# Patient Record
Sex: MALE | Race: WHITE | Employment: UNEMPLOYED | URBAN - METROPOLITAN AREA
[De-identification: names, ages, dates, MRNs, and addresses within clinical notes are randomized per-mention and may not be internally consistent; named-entity substitution may affect disease eponyms.]

---

## 2017-12-13 ENCOUNTER — NON-PROVIDER VISIT (OUTPATIENT)
Dept: URGENT CARE | Facility: PHYSICIAN GROUP | Age: 22
End: 2017-12-13

## 2017-12-13 DIAGNOSIS — Z11.1 PPD SCREENING TEST: ICD-10-CM

## 2017-12-13 PROCEDURE — 86580 TB INTRADERMAL TEST: CPT | Performed by: PHYSICIAN ASSISTANT

## 2017-12-13 NOTE — NON-PROVIDER
Robbie Martinez is a 22 y.o. male here for a non-provider visit for PPD placement -- Step 1 of 1    Reason for PPD:  work requirement    1. TB evaluation questionnaire completed by patient? Yes      -  If any answers marked yes did you contact a provider prior to placing? Not Indicated  2.  Patient notified to return to clinic for reading on: 12/15-12/16  3.  PPD Placement documentation completed on TB evaluation questionnaire? Yes  4.  Location of TB evaluation questionnaire filed: front office folder

## 2017-12-15 ENCOUNTER — NON-PROVIDER VISIT (OUTPATIENT)
Dept: URGENT CARE | Facility: PHYSICIAN GROUP | Age: 22
End: 2017-12-15

## 2017-12-15 LAB — TB WHEAL 3D P 5 TU DIAM: NORMAL MM

## 2017-12-15 NOTE — NON-PROVIDER
Robbie Martinez is a 22 y.o. male here for a non-provider visit for PPD reading -- Step 1 of 1.      1.  Resulted in Epic under enter/edit results? Yes   2.  TB evaluation questionnaire scanned into chart and original given to patient?Yes      3. Was induration greater than 0 mm? No.    Routed to PCP? No

## 2018-01-23 ENCOUNTER — OFFICE VISIT (OUTPATIENT)
Dept: URGENT CARE | Facility: PHYSICIAN GROUP | Age: 23
End: 2018-01-23
Payer: COMMERCIAL

## 2018-01-23 ENCOUNTER — HOSPITAL ENCOUNTER (OUTPATIENT)
Dept: RADIOLOGY | Facility: MEDICAL CENTER | Age: 23
End: 2018-01-23
Attending: PHYSICIAN ASSISTANT
Payer: COMMERCIAL

## 2018-01-23 VITALS
HEIGHT: 76 IN | WEIGHT: 250 LBS | DIASTOLIC BLOOD PRESSURE: 78 MMHG | BODY MASS INDEX: 30.44 KG/M2 | OXYGEN SATURATION: 96 % | SYSTOLIC BLOOD PRESSURE: 122 MMHG | RESPIRATION RATE: 16 BRPM | TEMPERATURE: 98.2 F | HEART RATE: 85 BPM

## 2018-01-23 DIAGNOSIS — R07.9 CHEST PAIN, UNSPECIFIED TYPE: ICD-10-CM

## 2018-01-23 PROCEDURE — 99214 OFFICE O/P EST MOD 30 MIN: CPT | Performed by: PHYSICIAN ASSISTANT

## 2018-01-23 PROCEDURE — 71046 X-RAY EXAM CHEST 2 VIEWS: CPT

## 2018-01-23 ASSESSMENT — ENCOUNTER SYMPTOMS
BLURRED VISION: 0
SENSORY CHANGE: 0
FEVER: 0
PND: 0
FOCAL WEAKNESS: 0
COUGH: 0
SPEECH CHANGE: 0
LOSS OF CONSCIOUSNESS: 0
TREMORS: 0
TINGLING: 0
CHILLS: 0
SHORTNESS OF BREATH: 0
HEADACHES: 0
DOUBLE VISION: 0
CLAUDICATION: 0
ORTHOPNEA: 0
SEIZURES: 0
PALPITATIONS: 0
DIZZINESS: 0

## 2018-01-23 NOTE — PROGRESS NOTES
"Subjective:      Robbie Martinez is a 22 y.o. male who presents with Chest Pain (when walking between classes today at [facility])            Chest Pain    This is a new problem. The current episode started today. The onset quality is sudden. The problem has been resolved. The pain is present in the substernal region. The pain is moderate. The quality of the pain is described as sharp. The pain does not radiate. Pertinent negatives include no claudication, cough, dizziness, fever, headaches, orthopnea, palpitations, PND or shortness of breath.   Pertinent negatives for past medical history include no seizures.       Review of Systems   Constitutional: Negative for chills and fever.   Eyes: Negative for blurred vision and double vision.   Respiratory: Negative for cough and shortness of breath.    Cardiovascular: Positive for chest pain. Negative for palpitations, orthopnea, claudication, leg swelling and PND.   Skin: Negative for rash.   Neurological: Negative for dizziness, tingling, tremors, sensory change, speech change, focal weakness, seizures, loss of consciousness and headaches.   All other systems reviewed and are negative.    PMH:  has no past medical history on file.  MEDS: No current outpatient prescriptions on file.  ALLERGIES: No Known Allergies  SURGHX: History reviewed. No pertinent surgical history.  SOCHX:  reports that he has never smoked. He has never used smokeless tobacco.  FH: Family history was reviewed, no pertinent findings to report  Medications, Allergies, and current problem list reviewed today in Epic       Objective:     /78   Pulse 85   Temp 36.8 °C (98.2 °F)   Resp 16   Ht 1.93 m (6' 4\")   Wt 113.4 kg (250 lb)   SpO2 96%   BMI 30.43 kg/m²      Physical Exam   Constitutional: He is oriented to person, place, and time. He appears well-developed and well-nourished.   Cardiovascular: Normal rate, regular rhythm, normal heart sounds, intact distal pulses and normal pulses.    "   Pulmonary/Chest: Effort normal and breath sounds normal.   Musculoskeletal: He exhibits tenderness. He exhibits no edema or deformity.        Arms:  Neurological: He is alert and oriented to person, place, and time. He has normal reflexes. He displays normal reflexes. He exhibits normal muscle tone. Coordination normal.   Skin: Skin is warm and dry.   Psychiatric: He has a normal mood and affect. His behavior is normal. Judgment and thought content normal.   Vitals reviewed.              Assessment/Plan:   Patient is a 22-year-old male who presents with chest pain. Patient states that the pain came on abruptly while he was walking in between classes. The pain dissipates after exertion. Currently there is no pain. He denies shortness of breath Vital signs normal. Lungs are clear to auscultation. There is mild pain to palpation in the substernal area. EKG is normal. Chest x-ray is normal. He does not have clinical signs of DVT or PE. Most likely this is chest wall pain. Recommend rest NSAIDs. Strict emergency room precautions are given. If pain continues or worsens.  1. Chest pain, unspecified type    - DX-CHEST-2 VIEWS; Future    Differential diagnosis, natural history, supportive care, and indications for immediate follow-up discussed at length.   Follow-up with primary care provider within 4-5 days, emergency room precautions discussed.  Patient and/or family appears understanding of information.

## 2018-04-12 ENCOUNTER — OFFICE VISIT (OUTPATIENT)
Dept: URGENT CARE | Facility: PHYSICIAN GROUP | Age: 23
End: 2018-04-12
Payer: COMMERCIAL

## 2018-04-12 VITALS
HEART RATE: 80 BPM | DIASTOLIC BLOOD PRESSURE: 76 MMHG | OXYGEN SATURATION: 95 % | WEIGHT: 240 LBS | RESPIRATION RATE: 14 BRPM | SYSTOLIC BLOOD PRESSURE: 114 MMHG | BODY MASS INDEX: 29.22 KG/M2 | HEIGHT: 76 IN | TEMPERATURE: 98.4 F

## 2018-04-12 DIAGNOSIS — K64.9 BLEEDING HEMORRHOID: ICD-10-CM

## 2018-04-12 PROCEDURE — 99213 OFFICE O/P EST LOW 20 MIN: CPT | Performed by: FAMILY MEDICINE

## 2018-04-12 ASSESSMENT — ENCOUNTER SYMPTOMS
EYE REDNESS: 0
WEIGHT LOSS: 0
EYE DISCHARGE: 0

## 2018-04-12 NOTE — PROGRESS NOTES
"Subjective:      Robbie Martinez is a 22 y.o. male who presents with Abdominal Pain            Onset yesterday multiple episodes watery diarrhea. Has seen [facility] with wiping and associated anal pain. Intermittent cramping abdominal pain. Normal urine output. No recent travel/camping/abx. No N/V. No fever. No other aggravating or alleviating factors.          Review of Systems   Constitutional: Negative for malaise/fatigue and weight loss.   Eyes: Negative for discharge and redness.   Skin: Negative for itching and rash.     .  Medications, Allergies, and current problem list reviewed today in Epic       Objective:     /76   Pulse 80   Temp 36.9 °C (98.4 °F)   Resp 14   Ht 1.93 m (6' 4\")   Wt 108.9 kg (240 lb)   SpO2 95%   BMI 29.21 kg/m²      Physical Exam   Constitutional: He appears well-developed and well-nourished. No distress.   HENT:   Head: Normocephalic and atraumatic.   Genitourinary:   Genitourinary Comments: Small hemorrhoid at 11 o'clock. No fissure noted. No active bleeding or blood in vault. Anoscopy negative for blood on lesions. No mass   Neurological:   Speech is clear. Patient is appropriate and cooperative.     Skin: Skin is warm and dry. No rash noted.               Assessment/Plan:     1. Bleeding hemorrhoid         Suspect bleeding is from small hemorrhoid. Patient will call with persistent symptoms for referral to GI.  "

## 2018-04-12 NOTE — LETTER
April 12, 2018         Patient: Robbie Martinez   YOB: 1995   Date of Visit: 4/12/2018           To Whom it May Concern:    Robbie Martinez was seen in my clinic on 4/12/2018. Please excuse 4/12 and 4/13/2018.      Sincerely,           Humberto Dukes M.D.  Electronically Signed

## 2018-06-06 ENCOUNTER — OFFICE VISIT (OUTPATIENT)
Dept: URGENT CARE | Facility: PHYSICIAN GROUP | Age: 23
End: 2018-06-06
Payer: COMMERCIAL

## 2018-06-06 VITALS
SYSTOLIC BLOOD PRESSURE: 132 MMHG | HEIGHT: 76 IN | BODY MASS INDEX: 29.96 KG/M2 | OXYGEN SATURATION: 95 % | DIASTOLIC BLOOD PRESSURE: 86 MMHG | WEIGHT: 246 LBS | TEMPERATURE: 102.5 F | HEART RATE: 120 BPM

## 2018-06-06 DIAGNOSIS — J30.9 ALLERGIC RHINITIS, UNSPECIFIED SEASONALITY, UNSPECIFIED TRIGGER: ICD-10-CM

## 2018-06-06 DIAGNOSIS — J02.9 SORE THROAT: ICD-10-CM

## 2018-06-06 DIAGNOSIS — J03.00 STREP TONSILLITIS: ICD-10-CM

## 2018-06-06 LAB
INT CON NEG: NEGATIVE
INT CON POS: POSITIVE
S PYO AG THROAT QL: POSITIVE

## 2018-06-06 PROCEDURE — 99214 OFFICE O/P EST MOD 30 MIN: CPT | Performed by: EMERGENCY MEDICINE

## 2018-06-06 PROCEDURE — 87880 STREP A ASSAY W/OPTIC: CPT | Performed by: EMERGENCY MEDICINE

## 2018-06-06 RX ORDER — IBUPROFEN 200 MG
200 TABLET ORAL EVERY 6 HOURS PRN
COMMUNITY
End: 2022-01-21

## 2018-06-06 RX ORDER — AMOXICILLIN 500 MG/1
500 CAPSULE ORAL 2 TIMES DAILY
Qty: 20 CAP | Refills: 0 | Status: SHIPPED | OUTPATIENT
Start: 2018-06-06 | End: 2018-06-16

## 2018-06-06 ASSESSMENT — ENCOUNTER SYMPTOMS
VOMITING: 0
SWOLLEN GLANDS: 1
SHORTNESS OF BREATH: 0
NAUSEA: 1
TROUBLE SWALLOWING: 0
ABDOMINAL PAIN: 0
DIARRHEA: 0
MYALGIAS: 1
HEADACHES: 1
COUGH: 0

## 2018-06-06 NOTE — PATIENT INSTRUCTIONS
Use an oral probiotic daily, such as Culturelle, Align, or yogurt to reduce gastrointestinal symptoms.  Strep Throat  Strep throat is a bacterial infection of the throat. Your health care provider may call the infection tonsillitis or pharyngitis, depending on whether there is swelling in the tonsils or at the back of the throat. Strep throat is most common during the cold months of the year in children who are 5-15 years of age, but it can happen during any season in people of any age. This infection is spread from person to person (contagious) through coughing, sneezing, or close contact.  What are the causes?  Strep throat is caused by the bacteria called Streptococcus pyogenes.  What increases the risk?  This condition is more likely to develop in:  · People who spend time in crowded places where the infection can spread easily.  · People who have close contact with someone who has strep throat.  What are the signs or symptoms?  Symptoms of this condition include:  · Fever or chills.  · Redness, swelling, or pain in the tonsils or throat.  · Pain or difficulty when swallowing.  · White or yellow spots on the tonsils or throat.  · Swollen, tender glands in the neck or under the jaw.  · Red rash all over the body (rare).  How is this diagnosed?  This condition is diagnosed by performing a rapid strep test or by taking a swab of your throat (throat culture test). Results from a rapid strep test are usually ready in a few minutes, but throat culture test results are available after one or two days.  How is this treated?  This condition is treated with antibiotic medicine.  Follow these instructions at home:  Medicines  · Take over-the-counter and prescription medicines only as told by your health care provider.  · Take your antibiotic as told by your health care provider. Do not stop taking the antibiotic even if you start to feel better.  · Have family members who also have a sore throat or fever tested for strep  throat. They may need antibiotics if they have the strep infection.  Eating and drinking  · Do not share food, drinking cups, or personal items that could cause the infection to spread to other people.  · If swallowing is difficult, try eating soft foods until your sore throat feels better.  · Drink enough fluid to keep your urine clear or pale yellow.  General instructions  · Gargle with a salt-water mixture 3-4 times per day or as needed. To make a salt-water mixture, completely dissolve ½-1 tsp of salt in 1 cup of warm water.  · Make sure that all household members wash their hands well.  · Get plenty of rest.  · Stay home from school or work until you have been taking antibiotics for 24 hours.  · Keep all follow-up visits as told by your health care provider. This is important.  Contact a health care provider if:  · The glands in your neck continue to get bigger.  · You develop a rash, cough, or earache.  · You cough up a thick liquid that is green, yellow-brown, or bloody.  · You have pain or discomfort that does not get better with medicine.  · Your problems seem to be getting worse rather than better.  · You have a fever.  Get help right away if:  · You have new symptoms, such as vomiting, severe headache, stiff or painful neck, chest pain, or shortness of breath.  · You have severe throat pain, drooling, or changes in your voice.  · You have swelling of the neck, or the skin on the neck becomes red and tender.  · You have signs of dehydration, such as fatigue, dry mouth, and decreased urination.  · You become increasingly sleepy, or you cannot wake up completely.  · Your joints become red or painful.  This information is not intended to replace advice given to you by your health care provider. Make sure you discuss any questions you have with your health care provider.  Document Released: 12/15/2001 Document Revised: 08/16/2017 Document Reviewed: 04/11/2016  Elsevier Interactive Patient Education © 2017  Elsevier Inc.  Allergic Rhinitis  Allergic rhinitis is when the mucous membranes in the nose respond to allergens. Allergens are particles in the air that cause your body to have an allergic reaction. This causes you to release allergic antibodies. Through a chain of events, these eventually cause you to release histamine into the blood stream. Although meant to protect the body, it is this release of histamine that causes your discomfort, such as frequent sneezing, congestion, and an itchy, runny nose.  What are the causes?  Seasonal allergic rhinitis (hay fever) is caused by pollen allergens that may come from grasses, trees, and weeds. Year-round allergic rhinitis (perennial allergic rhinitis) is caused by allergens such as house dust mites, pet dander, and mold spores.  What are the signs or symptoms?  · Nasal stuffiness (congestion).  · Itchy, runny nose with sneezing and tearing of the eyes.  How is this diagnosed?  Your health care provider can help you determine the allergen or allergens that trigger your symptoms. If you and your health care provider are unable to determine the allergen, skin or blood testing may be used. Your health care provider will diagnose your condition after taking your health history and performing a physical exam. Your health care provider may assess you for other related conditions, such as asthma, pink eye, or an ear infection.  How is this treated?  Allergic rhinitis does not have a cure, but it can be controlled by:  · Medicines that block allergy symptoms. These may include allergy shots, nasal sprays, and oral antihistamines.  · Avoiding the allergen.  Hay fever may often be treated with antihistamines in pill or nasal spray forms. Antihistamines block the effects of histamine. There are over-the-counter medicines that may help with nasal congestion and swelling around the eyes. Check with your health care provider before taking or giving this medicine.  If avoiding the  allergen or the medicine prescribed do not work, there are many new medicines your health care provider can prescribe. Stronger medicine may be used if initial measures are ineffective. Desensitizing injections can be used if medicine and avoidance does not work. Desensitization is when a patient is given ongoing shots until the body becomes less sensitive to the allergen. Make sure you follow up with your health care provider if problems continue.  Follow these instructions at home:  It is not possible to completely avoid allergens, but you can reduce your symptoms by taking steps to limit your exposure to them. It helps to know exactly what you are allergic to so that you can avoid your specific triggers.  Contact a health care provider if:  · You have a fever.  · You develop a cough that does not stop easily (persistent).  · You have shortness of breath.  · You start wheezing.  · Symptoms interfere with normal daily activities.  This information is not intended to replace advice given to you by your health care provider. Make sure you discuss any questions you have with your health care provider.  Document Released: 09/12/2002 Document Revised: 08/18/2017 Document Reviewed: 08/25/2014  [employer] Interactive Patient Education © 2017 Elsevier Inc.

## 2018-06-06 NOTE — LETTER
June 6, 2018       Patient: Robbie Martinez   YOB: 1995   Date of Visit: 6/6/2018         To Whom It May Concern:    It is my medical opinion that Robbie Martinez should not attend work today or tomorrow.      Sincerely,          Valdo Perez M.D.  Electronically Signed

## 2018-06-06 NOTE — PROGRESS NOTES
"Subjective:      Robbie Martinez is a 22 y.o. male who presents with Pharyngitis (sore throat, x4days fever x 1day fatigue, nausea, congestion)            Pharyngitis    This is a new problem. Episode onset: 4 days. The problem has been gradually worsening. Neither side of throat is experiencing more pain than the other. The maximum temperature recorded prior to his arrival was 102 - 102.9 F. The pain is moderate. Associated symptoms include congestion, headaches and swollen glands. Pertinent negatives include no abdominal pain, coughing, diarrhea, ear pain, plugged ear sensation, shortness of breath, trouble swallowing or vomiting. He has tried cool liquids for the symptoms. The treatment provided no relief.       Review of Systems   Constitutional: Positive for malaise/fatigue.   HENT: Positive for congestion and nosebleeds. Negative for ear pain, hearing loss and trouble swallowing.    Respiratory: Negative for cough and shortness of breath.    Gastrointestinal: Positive for nausea. Negative for abdominal pain, diarrhea and vomiting.   Musculoskeletal: Positive for myalgias.   Skin: Negative for rash.   Neurological: Positive for headaches.   Endo/Heme/Allergies: Positive for environmental allergies.     PMH:  has no past medical history on file.  MEDS:   Current Outpatient Prescriptions:   •  ibuprofen (MOTRIN) 200 MG Tab, Take 200 mg by mouth every 6 hours as needed., Disp: , Rfl:   ALLERGIES: No Known Allergies  SURGHX: History reviewed. No pertinent surgical history.  SOCHX:  reports that he has never smoked. He has never used smokeless tobacco. He reports that he uses drugs, including Marijuana, about 7 times per week. He reports that he does not drink alcohol.  FH: family history is not on file.       Objective:     /86   Pulse (!) 120   Temp (!) 39.2 °C (102.5 °F)   Ht 1.93 m (6' 4\")   Wt 111.6 kg (246 lb)   SpO2 95%   BMI 29.94 kg/m²      Physical Exam   Constitutional: He appears well-developed " and well-nourished. He is cooperative. He does not appear ill. No distress.   HENT:   Head: Normocephalic.   Right Ear: Tympanic membrane and ear canal normal.   Left Ear: Tympanic membrane and ear canal normal.   Nose: Mucosal edema present. No rhinorrhea. No epistaxis.   Mouth/Throat: Uvula is midline and mucous membranes are normal. No trismus in the jaw. No uvula swelling. No oropharyngeal exudate, posterior oropharyngeal edema or tonsillar abscesses. Tonsils are 1+ on the right. Tonsils are 1+ on the left. Tonsillar exudate.   Eyes: Conjunctivae are normal.   Neck: Trachea normal. Neck supple.   Cardiovascular: Regular rhythm and normal heart sounds.  Tachycardia present.    No murmur heard.  Pulmonary/Chest: Effort normal and breath sounds normal.   Lymphadenopathy:     He has cervical adenopathy.        Right cervical: Superficial cervical adenopathy present.        Left cervical: Superficial cervical adenopathy present.   Neurological: He is alert.   Skin: Skin is warm and dry.   Psychiatric: His behavior is normal.   Vitals reviewed.              Assessment/Plan:     1. Strep tonsillitis  Recommended supportive care measures, including rest, increasing oral fluid intake and use of over-the-counter medications for relief of symptoms.  Rx amoxicillin  2. Allergic rhinitis  Recommended nasal saline irrigation daily, OTC inhaled nasal steroid daily, OTC nonsedating antihistamine when necessary as tolerated.  3. Sore throat  positive- POCT Rapid Strep A

## 2018-11-25 ENCOUNTER — OFFICE VISIT (OUTPATIENT)
Dept: URGENT CARE | Facility: PHYSICIAN GROUP | Age: 23
End: 2018-11-25
Payer: COMMERCIAL

## 2018-11-25 VITALS
HEIGHT: 76 IN | SYSTOLIC BLOOD PRESSURE: 122 MMHG | HEART RATE: 74 BPM | OXYGEN SATURATION: 97 % | DIASTOLIC BLOOD PRESSURE: 78 MMHG | TEMPERATURE: 98.6 F | BODY MASS INDEX: 30.44 KG/M2 | WEIGHT: 250 LBS | RESPIRATION RATE: 16 BRPM

## 2018-11-25 DIAGNOSIS — R30.0 DYSURIA: ICD-10-CM

## 2018-11-25 LAB
APPEARANCE UR: CLEAR
BILIRUB UR STRIP-MCNC: NEGATIVE MG/DL
COLOR UR AUTO: CLEAR
GLUCOSE UR STRIP.AUTO-MCNC: NEGATIVE MG/DL
KETONES UR STRIP.AUTO-MCNC: NEGATIVE MG/DL
LEUKOCYTE ESTERASE UR QL STRIP.AUTO: NEGATIVE
NITRITE UR QL STRIP.AUTO: NEGATIVE
PH UR STRIP.AUTO: 6 [PH] (ref 5–8)
PROT UR QL STRIP: NEGATIVE MG/DL
RBC UR QL AUTO: NEGATIVE
SP GR UR STRIP.AUTO: 1
UROBILINOGEN UR STRIP-MCNC: NORMAL MG/DL

## 2018-11-25 PROCEDURE — 99213 OFFICE O/P EST LOW 20 MIN: CPT | Performed by: EMERGENCY MEDICINE

## 2018-11-25 PROCEDURE — 81002 URINALYSIS NONAUTO W/O SCOPE: CPT | Performed by: EMERGENCY MEDICINE

## 2018-11-25 NOTE — PROGRESS NOTES
"Subjective:      Robbie Martinez is a 23 y.o. male who presents with Dysuria (painful urination, x 1day)    HPI     Pt c/o  dysuria for the pasy day, denies  d/c, trauma or STI exposure.  No hx of renal stone or recent flank pain.    PMH:  has no past medical history on file.  MEDS:   Current Outpatient Prescriptions:   •  ibuprofen (MOTRIN) 200 MG Tab, Take 200 mg by mouth every 6 hours as needed., Disp: , Rfl:   ALLERGIES: No Known Allergies  SURGHX: History reviewed. No pertinent surgical history.  SOCHX:  reports that he has never smoked. He has never used smokeless tobacco. He reports that he uses drugs, including Marijuana, about 7 times per week. He reports that he does not drink alcohol.  FH: family history is not on file.  Review of Systems   Constitutional: Negative for chills and fever.   HENT: Negative for congestion.    Respiratory: Negative for cough.    Cardiovascular: Negative for chest pain.   Gastrointestinal: Negative for abdominal pain, nausea and vomiting.   Genitourinary: Positive for dysuria. Negative for flank pain, hematuria and urgency.   Skin: Negative for rash.   Neurological: Negative for sensory change and speech change.   Psychiatric/Behavioral: The patient is nervous/anxious.           Objective:     /78 (BP Location: Left arm, Patient Position: Sitting, BP Cuff Size: Adult)   Pulse 74   Temp 37 °C (98.6 °F) (Temporal)   Resp 16   Ht 1.93 m (6' 4\")   Wt 113.4 kg (250 lb)   SpO2 97%   BMI 30.43 kg/m²      Physical Exam   Constitutional: He appears well-developed and well-nourished. No distress.   HENT:   Head: Normocephalic.   Eyes: Conjunctivae are normal.   Cardiovascular: Normal rate.    Pulmonary/Chest: Effort normal.   Abdominal: He exhibits no distension. There is no tenderness.   Genitourinary: Penis normal. Rectal exam shows guaiac negative stool. No penile tenderness.   Musculoskeletal: Normal range of motion.   Neurological: He is alert. Coordination normal. "   Skin: No rash noted. He is not diaphoretic. No erythema.   Nursing note and vitals reviewed.           ua neg    gc swab pending  Assessment/Plan:     Dysuria    Pt will use AZO for the next 2 days I will call with the GC swab result, he will return if a d/c develops

## 2018-11-25 NOTE — LETTER
November 25, 2018        Robbie Martinez  [address] [facility] [address]        Dear Robbie:    Please ask for today off form work for medical reasons.    If you have any questions or concerns, please don't hesitate to call.        Sincerely,        Alberto Castellano M.D.    Electronically Signed

## 2018-11-27 ASSESSMENT — ENCOUNTER SYMPTOMS
NERVOUS/ANXIOUS: 1
ABDOMINAL PAIN: 0
SPEECH CHANGE: 0
COUGH: 0
NAUSEA: 0
FEVER: 0
VOMITING: 0
FLANK PAIN: 0
CHILLS: 0
SENSORY CHANGE: 0

## 2021-06-11 ENCOUNTER — HOSPITAL ENCOUNTER (OUTPATIENT)
Facility: MEDICAL CENTER | Age: 26
End: 2021-06-11
Attending: PHYSICIAN ASSISTANT
Payer: COMMERCIAL

## 2021-06-11 ENCOUNTER — OFFICE VISIT (OUTPATIENT)
Dept: URGENT CARE | Facility: PHYSICIAN GROUP | Age: 26
End: 2021-06-11
Payer: COMMERCIAL

## 2021-06-11 VITALS
HEART RATE: 83 BPM | HEIGHT: 76 IN | BODY MASS INDEX: 30.69 KG/M2 | RESPIRATION RATE: 17 BRPM | SYSTOLIC BLOOD PRESSURE: 130 MMHG | DIASTOLIC BLOOD PRESSURE: 84 MMHG | WEIGHT: 252 LBS | OXYGEN SATURATION: 97 % | TEMPERATURE: 97.2 F

## 2021-06-11 DIAGNOSIS — R39.198 DIFFICULTY URINATING: ICD-10-CM

## 2021-06-11 LAB
APPEARANCE UR: CLEAR
BILIRUB UR STRIP-MCNC: NORMAL MG/DL
COLOR UR AUTO: YELLOW
GLUCOSE UR STRIP.AUTO-MCNC: NORMAL MG/DL
KETONES UR STRIP.AUTO-MCNC: 40 MG/DL
LEUKOCYTE ESTERASE UR QL STRIP.AUTO: NORMAL
NITRITE UR QL STRIP.AUTO: NORMAL
PH UR STRIP.AUTO: 7 [PH] (ref 5–8)
PROT UR QL STRIP: NORMAL MG/DL
RBC UR QL AUTO: NORMAL
SP GR UR STRIP.AUTO: 1.02
UROBILINOGEN UR STRIP-MCNC: 0.2 MG/DL

## 2021-06-11 PROCEDURE — 99213 OFFICE O/P EST LOW 20 MIN: CPT | Performed by: PHYSICIAN ASSISTANT

## 2021-06-11 PROCEDURE — 81002 URINALYSIS NONAUTO W/O SCOPE: CPT | Performed by: PHYSICIAN ASSISTANT

## 2021-06-11 PROCEDURE — 87591 N.GONORRHOEAE DNA AMP PROB: CPT

## 2021-06-11 PROCEDURE — 87491 CHLMYD TRACH DNA AMP PROBE: CPT

## 2021-06-11 PROCEDURE — 87086 URINE CULTURE/COLONY COUNT: CPT

## 2021-06-11 ASSESSMENT — ENCOUNTER SYMPTOMS
FLANK PAIN: 0
CHILLS: 0
FEVER: 0
COUGH: 0
PALPITATIONS: 0
ABDOMINAL PAIN: 0
VOMITING: 0
SHORTNESS OF BREATH: 0
NAUSEA: 0
BACK PAIN: 0

## 2021-06-11 NOTE — LETTER
June 11, 2021         Patient: Robbie Martinez   YOB: 1995   Date of Visit: 6/11/2021           To Whom it May Concern:    Robbie Martinez was seen in my clinic on 6/11/2021. He may return to work today.  If you have any questions or concerns, please don't hesitate to call.        Sincerely,           Lito Borrego P.A.-C.  Electronically Signed

## 2021-06-11 NOTE — PROGRESS NOTES
"Subjective:   Robbie Martinez is a 25 y.o. male who presents for Urinary Retention (urgency, incontinence, groin pain 6/10)      Patient is a 25-year-old male who presents for evaluation of difficulty urinating.  Is been ongoing for 2 days.  He has been able to have small streams but does not feel like he is completely voiding.  He denies any pain or blood in the urine.  He has no history of prostate issues.      Review of Systems   Constitutional: Negative for chills and fever.   Respiratory: Negative for cough and shortness of breath.    Cardiovascular: Negative for chest pain and palpitations.   Gastrointestinal: Negative for abdominal pain, nausea and vomiting.   Genitourinary: Positive for dysuria, frequency and urgency. Negative for flank pain and hematuria.   Musculoskeletal: Negative for back pain.   All other systems reviewed and are negative.      Medications:    • ibuprofen Tabs    Allergies: Patient has no known allergies.    Problem List: Robbie Martinez does not have a problem list on file.    Surgical History:  No past surgical history on file.    Past Social Hx: Robbie Martinez  reports that he has never smoked. He has never used smokeless tobacco. He reports current drug use. Frequency: 7.00 times per week. Drug: Marijuana. He reports that he does not drink alcohol.     Past Family Hx:  Robbie Martinez family history is not on file.     Problem list, medications, and allergies reviewed by myself today in Epic.     Objective:     Blood Pressure 130/84 (BP Location: Right arm, Patient Position: Sitting, BP Cuff Size: Adult)   Pulse 83   Temperature 36.2 °C (97.2 °F) (Temporal)   Respiration 17   Height 1.93 m (6' 4\")   Weight 114 kg (252 lb)   Oxygen Saturation 97%   Body Mass Index 30.67 kg/m²     Physical Exam  Vitals reviewed.   Constitutional:       Appearance: He is well-developed.   HENT:      Head: Normocephalic and atraumatic.      Right Ear: External ear normal.      Left Ear: External ear " normal.      Nose: Nose normal.   Cardiovascular:      Rate and Rhythm: Normal rate and regular rhythm.      Heart sounds: Normal heart sounds.   Pulmonary:      Effort: Pulmonary effort is normal.      Breath sounds: Normal breath sounds.   Abdominal:      General: Bowel sounds are normal. There is no distension.      Palpations: Abdomen is soft. There is no mass.      Tenderness: There is no abdominal tenderness. There is no right CVA tenderness, left CVA tenderness, guarding or rebound.      Hernia: No hernia is present.   Musculoskeletal:      Cervical back: Normal range of motion and neck supple.   Skin:     General: Skin is warm and dry.   Neurological:      Mental Status: He is alert and oriented to person, place, and time.   Psychiatric:         Behavior: Behavior normal.         Thought Content: Thought content normal.         Judgment: Judgment normal.         Assessment/Plan:     Medical Decision Making/Comments     Patient is a 25-year-old male who presents with issues of difficulty urinating for 2 days.  He states that symptoms are improving.  He denies any back pain but does have some mild lower abdominal pain.  Vitals within normal limits.  Exam is unremarkable.  Prostate exam declined.  His UA is negative.  Will refer to urology while cultures pending.  ED precautions discussed.   Diagnosis and associated orders     1. Difficulty urinating  POCT Urinalysis    Urine Culture    Chlamydia/GC PCR Urine Or Swab    REFERRAL TO UROLOGY              Differential diagnosis, natural history, supportive care, and indications for immediate follow-up discussed.    Advised the patient to follow-up with the primary care physician for recheck, reevaluation, and consideration of further management.    Please note that this dictation was created using voice recognition software. I have made a reasonable attempt to correct obvious errors, but I expect that there are errors of grammar and possibly content that I did not  discover before finalizing the note.

## 2021-06-11 NOTE — PATIENT INSTRUCTIONS
Acute Urinary Retention, Male    Acute urinary retention is a condition in which a person is unable to pass urine. This can last for a short time or for a long time. If left untreated, it can result in kidney damage or other serious complications.  What are the causes?  This condition may be caused by:  · Obstruction or narrowing of the tube that drains the bladder (urethra). This may be caused by surgery or problems with nearby organs, such as the prostate gland, which can press or squeeze the urethra.  · Problems with the nerves in the bladder. These can be caused by diseases, such as multiple sclerosis, or by spinal cord injuries.  · Certain medicines.  · Tumors in the area of the pelvis, bladder, or urethra.  · Diabetes.  · Degenerative cognitive conditions such as delirium or dementia.  · Bladder or urinary tract infection.  · Constipation.  · Blood in the urine (hematuria).  · Injury to the bladder or urethra.   · Psychological (psychogenic) conditions. Someone may hold his urine due to trauma or because he does not want to use the bathroom.  What increases the risk?  This condition is more likely to develop in older men. As men age, their prostate may become larger and may start pressing or squeezing on the bladder or the urethra.  What are the signs or symptoms?  Symptoms of this condition include:  · Trouble urinating.  · Pain in the lower abdomen.  Symptoms usually come on slowly over a long period of time.  How is this diagnosed?  This condition is diagnosed based on a physical exam and a medical history. You may also have other tests, including:  · An ultrasound of the bladder or kidneys or both.  · Blood tests.  · A urine analysis.  · Additional tests may be needed such as an MRI, kidney, or bladder function tests.  How is this treated?  Treatment for this condition may include:  · Medicines.  · Placing a thin, sterile tube (catheter) into the bladder to drain urine out of the body. This is called an  indwelling urinary catheter. After being inserted, the catheter is held in place with a small balloon that is filled with sterile water. Urine drains from the catheter into a collection bag outside of the body.  · Behavioral therapy.  · Treatment for any underlying conditions.  · If needed, you may be treated in the hospital for kidney function problems or to manage other complications.  Follow these instructions at home:  · Take over-the-counter and prescription medicines only as told by your health care provider. Avoid certain medicines, such as decongestants, antihistamines, and some prescription medicines. Do not take any medicine unless your health care provider has approved.  · If you were given an indwelling urinary catheter, take care of it as told by your health care provider.  · Drink enough fluid to keep your urine clear or pale yellow.  · If you were prescribed an antibiotic, take it as told by your health care provider. Do not stop taking the antibiotic even if you start to feel better.  · Do not use any products that contain nicotine or tobacco, such as cigarettes and e-cigarettes. If you need help quitting, ask your health care provider.  · Monitor any changes in your symptoms. Tell your health care provider about any changes.  · If instructed, monitor your blood pressure at home. Report changes as told by your health care provider.  · Keep all follow-up visits as told by your health care provider. This is important.  Contact a health care provider if:  · You have uncomfortable bladder contractions that you cannot control (spasms) or you leak urine with the spasms.  Get help right away if:  · You have chills or fever.  · You have blood in your urine.  · You have a catheter and:  ? Your catheter stops draining urine.  ? Your catheter falls out.  Summary  · Acute urinary retention is a condition in which a person is unable to pass urine. If left untreated, it can result in kidney damage or other serious  complications.  · The cause of this condition may include an enlarged prostate. As men age, their prostate gland may become larger and may start pressing or squeezing on the bladder or the urethra.  · Treatment for this condition may include medicines and placement of an indwelling urinary catheter.  · Monitor any changes in your symptoms. Tell your health care provider about any changes.  This information is not intended to replace advice given to you by your health care provider. Make sure you discuss any questions you have with your health care provider.  Document Released: 03/26/2002 Document Revised: 11/30/2018 Document Reviewed: 01/19/2018  Elsevier Patient Education © 2020 [employer]

## 2021-06-12 LAB
C TRACH DNA SPEC QL NAA+PROBE: NEGATIVE
N GONORRHOEA DNA SPEC QL NAA+PROBE: NEGATIVE
SPECIMEN SOURCE: NORMAL

## 2021-06-13 LAB
BACTERIA UR CULT: NORMAL
SIGNIFICANT IND 70042: NORMAL
SITE SITE: NORMAL
SOURCE SOURCE: NORMAL

## 2022-01-21 ENCOUNTER — OFFICE VISIT (OUTPATIENT)
Dept: URGENT CARE | Facility: PHYSICIAN GROUP | Age: 27
End: 2022-01-21
Payer: COMMERCIAL

## 2022-01-21 VITALS
RESPIRATION RATE: 16 BRPM | HEIGHT: 75 IN | HEART RATE: 119 BPM | BODY MASS INDEX: 29.84 KG/M2 | SYSTOLIC BLOOD PRESSURE: 140 MMHG | DIASTOLIC BLOOD PRESSURE: 82 MMHG | WEIGHT: 240 LBS | TEMPERATURE: 98.9 F | OXYGEN SATURATION: 96 %

## 2022-01-21 DIAGNOSIS — R00.2 PALPITATIONS: ICD-10-CM

## 2022-01-21 DIAGNOSIS — Z87.898 HISTORY OF TACHYCARDIA: ICD-10-CM

## 2022-01-21 PROCEDURE — 93000 ELECTROCARDIOGRAM COMPLETE: CPT | Performed by: NURSE PRACTITIONER

## 2022-01-21 PROCEDURE — 99213 OFFICE O/P EST LOW 20 MIN: CPT | Performed by: NURSE PRACTITIONER

## 2022-01-21 RX ORDER — FLUTICASONE PROPIONATE 50 MCG
1 SPRAY, SUSPENSION (ML) NASAL DAILY
COMMUNITY

## 2022-01-21 RX ORDER — CETIRIZINE HYDROCHLORIDE 10 MG/1
10 TABLET ORAL DAILY
COMMUNITY

## 2022-01-21 ASSESSMENT — ENCOUNTER SYMPTOMS
CONSTITUTIONAL NEGATIVE: 1
COUGH: 0
SHORTNESS OF BREATH: 0
FEVER: 0
PALPITATIONS: 1
RESPIRATORY NEGATIVE: 1

## 2022-01-21 ASSESSMENT — VISUAL ACUITY: OU: 1

## 2022-01-21 NOTE — PATIENT INSTRUCTIONS
Palpitations  Palpitations are feelings that your heartbeat is irregular or is faster than normal. It may feel like your heart is fluttering or skipping a beat. Palpitations are usually not a serious problem. They may be caused by many things, including smoking, caffeine, alcohol, stress, and certain medicines or drugs. Most causes of palpitations are not serious. However, some palpitations can be a sign of a serious problem. You may need further tests to rule out serious medical problems.  Follow these instructions at home:         Pay attention to any changes in your condition. Take these actions to help manage your symptoms:  Eating and drinking  · Avoid foods and drinks that may cause palpitations. These may include:  ? Caffeinated coffee, tea, soft drinks, diet pills, and energy drinks.  ? Chocolate.  ? Alcohol.  Lifestyle  · Take steps to reduce your stress and anxiety. Things that can help you relax include:  ? Yoga.  ? Mind-body activities, such as deep breathing, meditation, or using words and images to create positive thoughts (guided imagery).  ? Physical activity, such as swimming, jogging, or walking. Tell your health care provider if your palpitations increase with activity. If you have chest pain or shortness of breath with activity, do not continue the activity until you are seen by your health care provider.  ? Biofeedback. This is a method that helps you learn to use your mind to control things in your body, such as your heartbeat.  · Do not use drugs, including cocaine or ecstasy. Do not use marijuana.  · Get plenty of rest and sleep. Keep a regular bed time.  General instructions  · Take over-the-counter and prescription medicines only as told by your health care provider.  · Do not use any products that contain nicotine or tobacco, such as cigarettes and e-cigarettes. If you need help quitting, ask your health care provider.  · Keep all follow-up visits as told by your health care provider. This  is important. These may include visits for further testing if palpitations do not go away or get worse.  Contact a health care provider if you:  · Continue to have a fast or irregular heartbeat after 24 hours.  · Notice that your palpitations occur more often.  Get help right away if you:  · Have chest pain or shortness of breath.  · Have a severe headache.  · Feel dizzy or you faint.  Summary  · Palpitations are feelings that your heartbeat is irregular or is faster than normal. It may feel like your heart is fluttering or skipping a beat.  · Palpitations may be caused by many things, including smoking, caffeine, alcohol, stress, certain medicines, and drugs.  · Although most causes of palpitations are not serious, some causes can be a sign of a serious medical problem.  · Get help right away if you faint or have chest pain, shortness of breath, a severe headache, or dizziness.  This information is not intended to replace advice given to you by your health care provider. Make sure you discuss any questions you have with your health care provider.  Document Released: 12/15/2001 Document Revised: 01/30/2019 Document Reviewed: 01/30/2019  [employer] Patient Education © 2020 [employer]      A referral request has been placed for cardiology. We have a referrals department that is tasked with locating a suitable office that currently accepts patients and your insurance and you should be receiving referral information from them such as the office name, address, and number. This process usually takes around 3 business days. If you are not contacted by our referrals department, please call [phone].

## 2022-02-16 ENCOUNTER — TELEPHONE (OUTPATIENT)
Dept: CARDIOLOGY | Facility: MEDICAL CENTER | Age: 27
End: 2022-02-16
Payer: COMMERCIAL

## 2022-02-16 NOTE — TELEPHONE ENCOUNTER
Chart Prep  S/W patient in regards to requesting records for NP appt with Dr. Carpio. Patient has not been seen by a prior cardiologist, but did go to the ER 10 years ago for palpitations and those records are in [facility]. Patient has had an EKG done at urgent care recently, that is in [facility] as well. Patient states he has not had labs done in 10 years but has an appointment to do so on 2/28/22. Patient verbally confirmed time/date/location of appt.

## 2022-02-24 ENCOUNTER — OFFICE VISIT (OUTPATIENT)
Dept: CARDIOLOGY | Facility: MEDICAL CENTER | Age: 27
End: 2022-02-24
Payer: COMMERCIAL

## 2022-02-24 VITALS
OXYGEN SATURATION: 94 % | SYSTOLIC BLOOD PRESSURE: 122 MMHG | DIASTOLIC BLOOD PRESSURE: 78 MMHG | RESPIRATION RATE: 16 BRPM | HEART RATE: 99 BPM | HEIGHT: 75 IN | WEIGHT: 240 LBS | BODY MASS INDEX: 29.84 KG/M2

## 2022-02-24 DIAGNOSIS — R00.2 PALPITATIONS: Primary | ICD-10-CM

## 2022-02-24 PROCEDURE — 99202 OFFICE O/P NEW SF 15 MIN: CPT | Performed by: INTERNAL MEDICINE

## 2022-02-24 RX ORDER — TRIAMCINOLONE ACETONIDE 1 MG/G
CREAM TOPICAL
COMMUNITY
End: 2022-02-24

## 2022-02-24 ASSESSMENT — ENCOUNTER SYMPTOMS
SYNCOPE: 0
PALPITATIONS: 0
WEIGHT LOSS: 0
VOMITING: 0
NEAR-SYNCOPE: 0
ORTHOPNEA: 0
ABDOMINAL PAIN: 0
HEARTBURN: 0
DIARRHEA: 0
NAUSEA: 0
FLANK PAIN: 0
CLAUDICATION: 0
WEIGHT GAIN: 0
BLURRED VISION: 0
DIZZINESS: 0
IRREGULAR HEARTBEAT: 0
FEVER: 0
COUGH: 0
PND: 0
DYSPNEA ON EXERTION: 0
ALTERED MENTAL STATUS: 0
CONSTIPATION: 0
DECREASED APPETITE: 0
DEPRESSION: 0
BACK PAIN: 0
SHORTNESS OF BREATH: 0

## 2022-02-24 NOTE — PATIENT INSTRUCTIONS
Palpitations  Palpitations are feelings that your heartbeat is irregular or is faster than normal. It may feel like your heart is fluttering or skipping a beat. Palpitations are usually not a serious problem. They may be caused by many things, including smoking, caffeine, alcohol, stress, and certain medicines or drugs. Most causes of palpitations are not serious. However, some palpitations can be a sign of a serious problem. You may need further tests to rule out serious medical problems.  Follow these instructions at home:         Pay attention to any changes in your condition. Take these actions to help manage your symptoms:  Eating and drinking  · Avoid foods and drinks that may cause palpitations. These may include:  ? Caffeinated coffee, tea, soft drinks, diet pills, and energy drinks.  ? Chocolate.  ? Alcohol.  Lifestyle  · Take steps to reduce your stress and anxiety. Things that can help you relax include:  ? Yoga.  ? Mind-body activities, such as deep breathing, meditation, or using words and images to create positive thoughts (guided imagery).  ? Physical activity, such as swimming, jogging, or walking. Tell your health care provider if your palpitations increase with activity. If you have chest pain or shortness of breath with activity, do not continue the activity until you are seen by your health care provider.  ? Biofeedback. This is a method that helps you learn to use your mind to control things in your body, such as your heartbeat.  · Do not use drugs, including cocaine or ecstasy. Do not use marijuana.  · Get plenty of rest and sleep. Keep a regular bed time.  General instructions  · Take over-the-counter and prescription medicines only as told by your health care provider.  · Do not use any products that contain nicotine or tobacco, such as cigarettes and e-cigarettes. If you need help quitting, ask your health care provider.  · Keep all follow-up visits as told by your health care provider.  This is important. These may include visits for further testing if palpitations do not go away or get worse.  Contact a health care provider if you:  · Continue to have a fast or irregular heartbeat after 24 hours.  · Notice that your palpitations occur more often.  Get help right away if you:  · Have chest pain or shortness of breath.  · Have a severe headache.  · Feel dizzy or you faint.  Summary  · Palpitations are feelings that your heartbeat is irregular or is faster than normal. It may feel like your heart is fluttering or skipping a beat.  · Palpitations may be caused by many things, including smoking, caffeine, alcohol, stress, certain medicines, and drugs.  · Although most causes of palpitations are not serious, some causes can be a sign of a serious medical problem.  · Get help right away if you faint or have chest pain, shortness of breath, a severe headache, or dizziness.  This information is not intended to replace advice given to you by your health care provider. Make sure you discuss any questions you have with your health care provider.  Document Released: 12/15/2001 Document Revised: 01/30/2019 Document Reviewed: 01/30/2019  Elsevier Patient Education © 2020 [employer]

## 2022-02-24 NOTE — PROGRESS NOTES
Cardiology Note    Chief Complaint   Patient presents with   • Palpitations   • Tachycardia       History of Present Illness: Robbie Martinez is a 26 y.o. male who presents for initial visit. Referral for palpitations.     Recalls palpitations since middle school. Previous ablation in [place]. Associated with persistent shunt. Doesn't recall name of physicians or which hospital or office went to. He will check with his parents. Worsened again with covid-19 infection in January. Associated with dyspnea and fatigue. Improving now. Has not had any for last few weeks he says. Also resolved dyspnea and fatigue. Back to usual activities; working and exercising back to baseline. No toxic social habits outside of marijuana. No relevant family history.     Review of Systems   Constitutional: Negative for decreased appetite, fever, malaise/fatigue, weight gain and weight loss.   HENT: Negative for congestion and nosebleeds.    Eyes: Negative for blurred vision.   Cardiovascular: Negative for chest pain, claudication, dyspnea on exertion, irregular heartbeat, leg swelling, near-syncope, orthopnea, palpitations, paroxysmal nocturnal dyspnea and syncope.   Respiratory: Negative for cough and shortness of breath.    Endocrine: Negative for cold intolerance and heat intolerance.   Skin: Negative for rash.   Musculoskeletal: Negative for back pain.   Gastrointestinal: Negative for abdominal pain, constipation, diarrhea, heartburn, melena, nausea and vomiting.   Genitourinary: Negative for dysuria, flank pain and hematuria.   Neurological: Negative for dizziness.   Psychiatric/Behavioral: Negative for altered mental status and depression.         History reviewed. No pertinent past medical history.      History reviewed. No pertinent surgical history.      Current Outpatient Medications   Medication Sig Dispense Refill   • cetirizine (ZYRTEC) 10 MG Tab Take 10 mg by mouth every day.     • fluticasone (FLONASE) 50 MCG/ACT nasal spray  "Administer 1 Spray into affected nostril(S) every day.       No current facility-administered medications for this visit.         No Known Allergies      History reviewed. No pertinent family history.      Social History     Socioeconomic History   • Marital status: Single     Spouse name: Not on file   • Number of children: Not on file   • Years of education: Not on file   • Highest education level: Not on file   Occupational History   • Not on file   Tobacco Use   • Smoking status: Former Smoker     Quit date: 2018     Years since quittin.0   • Smokeless tobacco: Never Used   Vaping Use   • Vaping Use: Some days   • Substances: THC   • Devices: Disposable   Substance and Sexual Activity   • Alcohol use: No   • Drug use: Yes     Frequency: 7.0 times per week     Types: Marijuana, Inhaled, Oral   • Sexual activity: Yes     Partners: Female   Other Topics Concern   • Not on file   Social History Narrative   • Not on file     Social Determinants of Health     Financial Resource Strain: Not on file   Food Insecurity: Not on file   Transportation Needs: Not on file   Physical Activity: Not on file   Stress: Not on file   Social Connections: Not on file   Intimate Partner Violence: Not on file   Housing Stability: Not on file         Physical Exam:  Ambulatory Vitals  /78 (BP Location: Left arm, Patient Position: Sitting, BP Cuff Size: Adult)   Pulse 99   Resp 16   Ht 1.905 m (6' 3\")   Wt 109 kg (240 lb)   SpO2 94%    BP Readings from Last 4 Encounters:   22 122/78   22 140/82   21 130/84   18 122/78     Weight/BMI:   Vitals:    22 0946   BP: 122/78   Weight: 109 kg (240 lb)   Height: 1.905 m (6' 3\")    Body mass index is 30 kg/m².  Wt Readings from Last 4 Encounters:   22 109 kg (240 lb)   22 109 kg (240 lb)   21 114 kg (252 lb)   18 113 kg (250 lb)       Physical Exam  Constitutional:       General: He is not in acute distress.  HENT:      Head: " Normocephalic and atraumatic.   Eyes:      Conjunctiva/sclera: Conjunctivae normal.      Pupils: Pupils are equal, round, and reactive to light.   Neck:      Vascular: No JVD.   Cardiovascular:      Rate and Rhythm: Normal rate and regular rhythm.      Heart sounds: Normal heart sounds. No murmur heard.    No friction rub. No gallop.   Pulmonary:      Effort: Pulmonary effort is normal. No respiratory distress.      Breath sounds: Normal breath sounds. No wheezing or rales.   Chest:      Chest wall: No tenderness.   Abdominal:      General: Bowel sounds are normal. There is no distension.      Palpations: Abdomen is soft.   Musculoskeletal:      Cervical back: Normal range of motion and neck supple.   Skin:     General: Skin is warm and dry.   Neurological:      Mental Status: He is alert and oriented to person, place, and time.   Psychiatric:         Mood and Affect: Affect normal.         Judgment: Judgment normal.         Lab Data Review:  No results found for: CHOLSTRLTOT, LDL, HDL, TRIGLYCERIDE    Lab Results   Component Value Date/Time    SODIUM 139 05/20/2011 03:50 PM    POTASSIUM 3.6 05/20/2011 03:50 PM    CHLORIDE 107 05/20/2011 03:50 PM    CO2 27 05/20/2011 03:50 PM    GLUCOSE 91 05/20/2011 03:50 PM    BUN 16 05/20/2011 03:50 PM    CREATININE 0.80 05/20/2011 03:50 PM     CrCl cannot be calculated (Patient's most recent lab result is older than the maximum 7 days allowed.).  No results found for: ALKPHOSPHAT, ASTSGOT, ALTSGPT, TBILIRUBIN   Lab Results   Component Value Date/Time    WBC 8.4 05/20/2011 03:50 PM     No results found for: HBA1C  No components found for: TROP      Cardiac Imaging and Procedures Review:      EKG 2/24/22 interpreted by me sinus rhythm, early r/s transition    Medical Decision Making:  Problem List Items Addressed This Visit     Palpitations - Primary        Appear largely resolved. Likely associted with covid-19 infection. Overall improved from this. If symptoms should recur can  consider further testing. Attempt to obtain prior data/history from [place].     It was my pleasure to meet with Mr. Martinez.

## 2022-05-09 ENCOUNTER — HOSPITAL ENCOUNTER (OUTPATIENT)
Facility: MEDICAL CENTER | Age: 27
End: 2022-05-09
Attending: FAMILY MEDICINE
Payer: COMMERCIAL

## 2022-05-09 LAB
ALBUMIN SERPL BCP-MCNC: 4.4 G/DL (ref 3.2–4.9)
ALBUMIN/GLOB SERPL: 1.6 G/DL
ALP SERPL-CCNC: 85 U/L (ref 30–99)
ALT SERPL-CCNC: 22 U/L (ref 2–50)
ANION GAP SERPL CALC-SCNC: 11 MMOL/L (ref 7–16)
AST SERPL-CCNC: 8 U/L (ref 12–45)
BASOPHILS # BLD AUTO: 0.6 % (ref 0–1.8)
BASOPHILS # BLD: 0.05 K/UL (ref 0–0.12)
BILIRUB SERPL-MCNC: 0.6 MG/DL (ref 0.1–1.5)
BUN SERPL-MCNC: 10 MG/DL (ref 8–22)
CALCIUM SERPL-MCNC: 9.2 MG/DL (ref 8.5–10.5)
CHLORIDE SERPL-SCNC: 104 MMOL/L (ref 96–112)
CO2 SERPL-SCNC: 21 MMOL/L (ref 20–33)
CREAT SERPL-MCNC: 0.62 MG/DL (ref 0.5–1.4)
EOSINOPHIL # BLD AUTO: 0.1 K/UL (ref 0–0.51)
EOSINOPHIL NFR BLD: 1.2 % (ref 0–6.9)
ERYTHROCYTE [DISTWIDTH] IN BLOOD BY AUTOMATED COUNT: 41.6 FL (ref 35.9–50)
GFR SERPLBLD CREATININE-BSD FMLA CKD-EPI: 136 ML/MIN/1.73 M 2
GLOBULIN SER CALC-MCNC: 2.7 G/DL (ref 1.9–3.5)
GLUCOSE SERPL-MCNC: 108 MG/DL (ref 65–99)
HCT VFR BLD AUTO: 45.9 % (ref 42–52)
HGB BLD-MCNC: 15.2 G/DL (ref 14–18)
IMM GRANULOCYTES # BLD AUTO: 0.02 K/UL (ref 0–0.11)
IMM GRANULOCYTES NFR BLD AUTO: 0.2 % (ref 0–0.9)
LYMPHOCYTES # BLD AUTO: 2.85 K/UL (ref 1–4.8)
LYMPHOCYTES NFR BLD: 33.8 % (ref 22–41)
MCH RBC QN AUTO: 30.3 PG (ref 27–33)
MCHC RBC AUTO-ENTMCNC: 33.1 G/DL (ref 33.7–35.3)
MCV RBC AUTO: 91.6 FL (ref 81.4–97.8)
MONOCYTES # BLD AUTO: 0.44 K/UL (ref 0–0.85)
MONOCYTES NFR BLD AUTO: 5.2 % (ref 0–13.4)
NEUTROPHILS # BLD AUTO: 4.96 K/UL (ref 1.82–7.42)
NEUTROPHILS NFR BLD: 59 % (ref 44–72)
NRBC # BLD AUTO: 0 K/UL
NRBC BLD-RTO: 0 /100 WBC
PLATELET # BLD AUTO: 353 K/UL (ref 164–446)
PMV BLD AUTO: 10.2 FL (ref 9–12.9)
POTASSIUM SERPL-SCNC: 4 MMOL/L (ref 3.6–5.5)
PROT SERPL-MCNC: 7.1 G/DL (ref 6–8.2)
RBC # BLD AUTO: 5.01 M/UL (ref 4.7–6.1)
SODIUM SERPL-SCNC: 136 MMOL/L (ref 135–145)
T4 FREE SERPL-MCNC: 1.39 NG/DL (ref 0.93–1.7)
TSH SERPL DL<=0.005 MIU/L-ACNC: 0.64 UIU/ML (ref 0.38–5.33)
WBC # BLD AUTO: 8.4 K/UL (ref 4.8–10.8)

## 2022-05-09 PROCEDURE — 80053 COMPREHEN METABOLIC PANEL: CPT

## 2022-05-09 PROCEDURE — 85025 COMPLETE CBC W/AUTO DIFF WBC: CPT

## 2022-05-09 PROCEDURE — 83036 HEMOGLOBIN GLYCOSYLATED A1C: CPT

## 2022-05-09 PROCEDURE — 84439 ASSAY OF FREE THYROXINE: CPT

## 2022-05-09 PROCEDURE — 84443 ASSAY THYROID STIM HORMONE: CPT

## 2022-05-10 LAB
EST. AVERAGE GLUCOSE BLD GHB EST-MCNC: 111 MG/DL
HBA1C MFR BLD: 5.5 % (ref 4–5.6)